# Patient Record
Sex: MALE | Race: WHITE | NOT HISPANIC OR LATINO | URBAN - METROPOLITAN AREA
[De-identification: names, ages, dates, MRNs, and addresses within clinical notes are randomized per-mention and may not be internally consistent; named-entity substitution may affect disease eponyms.]

---
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
## 2021-05-24 ENCOUNTER — RECORDS - HEALTHEAST (OUTPATIENT)
Dept: ADMINISTRATIVE | Facility: CLINIC | Age: 21
End: 2021-05-24

## 2021-05-29 NOTE — TELEPHONE ENCOUNTER
I spoke with pt's mom regarding what orders were needed for his nurse only appointment Thursday. She told me the [employer] is requiring him to have a dose of IPV within  6 months of entrance, Tdap/Td within 5yrs of entrance, he is due for 2nd dose of Hep A as well. Please place future orders.   Amelia Martinez, RMA

## 2021-05-31 VITALS — BODY MASS INDEX: 18.73 KG/M2 | HEIGHT: 71 IN | WEIGHT: 133.8 LBS

## 2021-06-01 VITALS — HEIGHT: 72 IN | WEIGHT: 143.7 LBS | BODY MASS INDEX: 19.46 KG/M2

## 2021-06-02 ENCOUNTER — RECORDS - HEALTHEAST (OUTPATIENT)
Dept: ADMINISTRATIVE | Facility: CLINIC | Age: 21
End: 2021-06-02

## 2021-06-02 VITALS — WEIGHT: 149.3 LBS | BODY MASS INDEX: 20.22 KG/M2 | HEIGHT: 72 IN

## 2021-06-11 NOTE — PROGRESS NOTES
[facility] Well Child Check    ASSESSMENT & PLAN  Curt Castañeda is a 16  y.o. 9  m.o. who has normal growth and normal development.  He needs a [employer] sports participation form filled out.  He also needs a [employer]  [employer] form filled out.      Diagnoses and all orders for this visit:    Well adolescent visit  -     Vision Screening  -     Hearing Screening  -     Meningococcal MCV4P        Return to clinic in 1 year for a Well Child Check or sooner as needed    IMMUNIZATIONS/LABS  Immunizations were reviewed and orders were placed as appropriate. and I have discussed the risks and benefits of all of the vaccine components with the patient/parents.  All questions have been answered.  He was noted to have an innocent, functional, murmur at his last checkup 2 years ago.  He has normal cardiac exam with no murmur heard today.    REFERRALS  Dental:  The patient has already established care with a dentist.  Other:  No additional referrals were made at this time.    ANTICIPATORY GUIDANCE  I have reviewed age appropriate anticipatory guidance.    HEALTH HISTORY  Do you have any concerns that you'd like to discuss today?: No concerns       Roomed by: Peggy    Accompanied by Mother    Refills needed? No    Do you have any forms that need to be filled out? Yes        Do you have any significant health concerns in your family history?: No  No family history on file.  Since your last visit, have there been any major changes in your family, such as a move, job change, separation, divorce, or death in the family?: No    Home  Who lives in your home?:    Social History     Social History Narrative    Mom- Eveline  Dad- Aaron    Sister- Jhoana     Do you have any trouble with sleep?:  No    Education  What school does your child attend?:  [employer]  What grade is your child in?:  11th  How does the patient perform in school (grades, behavior, attention, homework?: doing well     Eating  Does patient  "eat regular meals including fruits and vegetables?:  yes  What is the patient drinking (cow's milk, water, soda, juice, sports drinks, energy drinks, etc)?: cow's milk- whole and water  Does patient have concerns about body or appearance?:  No    Activities  Does the patient have friends?:  yes  Does the patient get at least one hour of physical activity per day?:  yes  Does the patient have less than 2 hours of screen time per day (aside from homework)?:  no, more than 2 hours  What does your child do for exercise?:  Cross country, lacrosse, nordic ski  Does the patient have interest/participate in community activities/volunteers/school sports?:  yes    MENTAL HEALTH SCREENING  PHQ-2 Total Score: 0 (6/26/2017  2:00 PM)  PHQ-2 Total Score: 0 (6/26/2017  2:00 PM)    VISION/HEARING  Vision: Completed. See Results  Hearing:  Completed. See Results     Hearing Screening    125Hz 250Hz 500Hz 1000Hz 2000Hz 3000Hz 4000Hz 6000Hz 8000Hz   Right ear:   25 20 20  20     Left ear:   25 20 20  20        Visual Acuity Screening    Right eye Left eye Both eyes   Without correction: 20/20 20/20 20/20   With correction:          TB Risk Assessment:  The patient and/or parent/guardian answer positive to:  patient and/or parent/guardian answer 'no' to all screening TB questions    Dental  Is your child being seen by a dentist?  Yes  Flouride Varnish Application Screening  Is child seen by dentist?     Yes    Patient Active Problem List   Diagnosis     Innocent heart murmur       Drugs  Does the patient use tobacco/alcohol/drugs?:  no    Safety  Does the patient have any safety concerns (peer or home)?:  no  Does the patient use safety belts, helmets and other safety equipment?:  yes    Sex  Is the patient sexually active?:  no    MEASUREMENTS  Height:  5' 10.75\" (1.797 m)  Weight: 133 lb 12.8 oz (60.7 kg)  BMI: Body mass index is 18.79 kg/(m^2).  Blood Pressure: 98/68  Blood pressure percentiles are 2 % systolic and 49 % diastolic " based on NHBPEP's 4th Report. Blood pressure percentile targets: 90: 134/83, 95: 138/87, 99 + 5 mmH/100.    PHYSICAL EXAM  Constitutional: He appears well-developed and well-nourished.   HEENT: Head: Normocephalic.    Right Ear: Tympanic membrane, external ear and canal normal.    Left Ear: Tympanic membrane, external ear and canal normal.    Nose: Nose normal.    Mouth/Throat: Mucous membranes are moist. Oropharynx is clear.    Eyes: Conjunctivae and lids are normal. Pupils are equal, round, and reactive to light. Optic disc is sharp.   Neck: Neck supple. No tenderness is present.   Cardiovascular: Normal rate and regular rhythm. No murmur heard today.  Pulses: Femoral pulses are 2+ bilaterally.   Pulmonary/Chest: Effort normal and breath sounds normal. There is normal air entry.   Abdominal: Soft. There is no hepatosplenomegaly. No inguinal hernia.   Genitourinary: Testes normal and penis normal. Jimy stage 4  Musculoskeletal: Normal range of motion. Normal strength and tone. No abnormalities. Spine is straight. Normal duck walk. Normal heel-to-toe walk.   Neurological: He is alert. He has normal reflexes. Gait normal.   Psychiatric: He has a normal mood and affect. His speech is normal and behavior is normal.  Skin: Clear. No rashes.

## 2021-06-18 NOTE — PROGRESS NOTES
[facility] Well Child Check    ASSESSMENT & PLAN  Curt Castañeda is a 17  y.o. 9  m.o. who has normal growth and normal development.   Mom declines HPV vaccine.  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    Diagnoses and all orders for this visit:    Well adolescent visit  -     Lipid Cascade RANDOM  -     Hearing Screening  -     Vision Screening  -     PHQ9 Depression Screen        -     Hepatitis A Vaccine      Return to clinic in 1 year for a Well Child Check or sooner as needed    IMMUNIZATIONS/LABS  Immunizations were reviewed and orders were placed as appropriate. and I have discussed the risks and benefits of all of the vaccine components with the patient/parents.  All questions have been answered.    REFERRALS  Dental:  The patient has already established care with a dentist.  Other:  No additional referrals were made at this time.    ANTICIPATORY GUIDANCE  I have reviewed age appropriate anticipatory guidance.    HEALTH HISTORY  Do you have any concerns that you'd like to discuss today?: No concerns       Roomed by: Amelia    Accompanied by Mother    Refills needed? No    Do you have any forms that need to be filled out? Yes        Do you have any significant health concerns in your family history?: No  No family history on file.  Since your last visit, have there been any major changes in your family, such as a move, job change, separation, divorce, or death in the family?: No  Has a lack of transportation kept you from medical appointments?: No    Home  Who lives in your home?:    Social History     Social History Narrative    Mom- Eveline  Dad- Aaron    Sister- Jhoana     Do you have any concerns about losing your housing?: No  Is your housing safe and comfortable?: Yes  Do you have any trouble with sleep?:  No    Education  What school do you child attend?:  [employer]  What grade are you in?:  12th  How do you perform in school (grades, behavior, attention, homework?: good      Eating  Do you eat regular meals including fruits and vegetables?:  yes, well balanced  What are you drinking (cow's milk, water, soda, juice, sports drinks, energy drinks, etc)?: cow's milk- whole and water  Have you been worried that you don't have enough food?: No  Do you have concerns about your body or appearance?:  No    Activities  Do you have friends?:  yes  Do you get at least one hour of physical activity per day?:  yes  How many hours a day are you in front of a screen other than for schoolwork (computer, TV, phone)?:  5  What do you do for exercise?:  Running. 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 See Results  Hearing:  Completed. See Results     Hearing Screening    125Hz 250Hz 500Hz 1000Hz 2000Hz 3000Hz 4000Hz 6000Hz 8000Hz   Right ear:   25 20 20  20 20    Left ear:   25 20 20  20 20       Visual Acuity Screening    Right eye Left eye Both eyes   Without correction: 20/20 20/20 20/20   With correction:      Comments: Plus Lens: Pass: blurring of vision with +2.50 lens glasses      TB Risk Assessment:  The patient and/or parent/guardian answer positive to:  patient and/or parent/guardian answer 'no' to all screening TB questions    Dyslipidemia Risk Screening  Have either of your parents or any of your grandparents had a stroke or heart attack before age 55?: No  Any parents with high cholesterol or currently taking medications to treat?: No     Dental  When was the last time you saw the dentist?: 3-6 months ago   Parent/Guardian declines the fluoride varnish application today.    Patient Active Problem List   Diagnosis     Innocent heart murmur       Drugs  Does the patient use tobacco/alcohol/drugs?:  no    Safety  Does the patient have any safety concerns (peer or home)?:   "no  Does the patient use safety belts, helmets and other safety equipment?:  yes    Sex  Have you ever had sex?:  No    MEASUREMENTS  Height:  5' 11.5\" (1.816 m)  Weight: 143 lb 11.2 oz (65.2 kg)  BMI: Body mass index is 19.76 kg/(m^2).  Blood Pressure: 102/60  Blood pressure percentiles are 3 % systolic and 16 % diastolic based on NHBPEP's 4th Report. Blood pressure percentile targets: 90: 136/86, 95: 140/90, 99 + 5 mmH/103.    PHYSICAL EXAM  Constitutional: He appears well-developed and well-nourished.   HEENT: Head: Normocephalic.    Right Ear: Tympanic membrane, external ear and canal normal.    Left Ear: Tympanic membrane, external ear and canal normal.    Nose: Nose normal.    Mouth/Throat: Mucous membranes are moist. Oropharynx is clear.    Eyes: Conjunctivae and lids are normal. Pupils are equal, round, and reactive to light. Optic disc is sharp.   Neck: Neck supple. No tenderness is present.   Cardiovascular: Normal rate and regular rhythm. No murmur heard.  Pulses: Femoral pulses are 2+ bilaterally.   Pulmonary/Chest: Effort normal and breath sounds normal. There is normal air entry.   Abdominal: Soft. There is no hepatosplenomegaly. No inguinal hernia.   Genitourinary: Testes normal and penis normal. Jimy stage 4  Musculoskeletal: Normal range of motion. Normal strength and tone. No abnormalities. Spine is straight. Normal duck walk. Normal heel-to-toe walk.   Neurological: He is alert. He has normal reflexes. Gait normal.   Psychiatric: He has a normal mood and affect. His speech is normal and behavior is normal.  Skin: Clear. No rashes.     "

## 2021-06-18 NOTE — LETTER
Letter by Tracy Arroyo CNP at      Author: Tracy Arroyo CNP Service: -- Author Type: --    Filed:  Encounter Date: 3/4/2019 Status: (Other)       March 4, 2019     Patient: Curt Castañeda   YOB: 2000   Date of Visit: 3/4/2019       To Whom it May Concern:    Curt Castañeda was seen in my clinic on 3/4/2019. Please excuse his absence.  He can return to school.    If you have any questions or concerns, please don't hesitate to call.    Sincerely,         Electronically signed by Tracy Arroyo CNP

## 2021-06-18 NOTE — LETTER
Letter by Tracy Arroyo CNP at      Author: Tracy Arroyo CNP Service: -- Author Type: --    Filed:  Encounter Date: 3/4/2019 Status: (Other)       March 4, 2019     Patient: Curt Castañeda   YOB: 2000   Date of Visit: 3/4/2019       To Whom it May Concern:    Curt Castañeda was seen in my clinic on 3/4/2019.   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  He had a opinion and  an EKG done through the  which was normal.  It was recommended that he have a visit for a third opinion here in his primary clinic.  He has been noted to have bradycardia in the past few years in our clinic also.  He is an athlete.  He has a normal exam here today with no murmurs noted.  Heart rate was 52.  We obtained an EKG which was read by myself and Dr. Nickie Powell as normal.  We sent it off for a second opinion with our cardiologist.  They also read the EKG as normal.  Enclosed is the copy of the original EKG.    JAMARI Carlos  If you have any questions or concerns, please don't hesitate to call.    Sincerely,         Electronically signed by Tracy Arroyo CNP

## 2021-06-24 NOTE — PROGRESS NOTES
[facility] Pediatric Acute Visit     HPI:  Curt Castañeda is a 18 y.o.  male who presents to the clinic with mom.  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  They completed paperwork and mom stated that when he was little he did have an innocent murmur but that that had resolved.  When he went in for his original physical they noted that he was bradycardic with a heart rate of 60.  Review of our chart shows that he is a very fit athlete and today's heart rate is even 52.  They obtained an EKG through the  clinic and it was normal.  They are here today because they have been told that the  would like a third opinion and wanted him to be seen by a physician and recommended another EKG be done.  We have gone ahead and done this today.         Past Med / Surg History:  Past Medical History:   Diagnosis Date     Innocent heart murmur      Pyloric stenosis, congenital      No past surgical history on file.    Fam / Soc History:  No family history on file.  Social History     Social History Narrative    Mom- Eveline Kaba- Aaron    Sister- Jhoana         ROS:  Gen: No fever or fatigue  Eyes: No eye discharge.   ENT: No nasal congestion or rhinorrhea. No pharyngitis. No otalgia.  Resp: No SOB, cough or wheezing.  GI:No diarrhea, nausea or vomiting  :No dysuria  MS: No joint/bone/muscle tenderness.  Skin: No rashes  Neuro: No headaches  Lymph/Hematologic: No gland swelling      Objective:  Vitals: /68   Pulse 52   Temp 98.4  F (36.9  C) (Oral)   Ht 6' (1.829 m)   Wt 149 lb 4.8 oz (67.7 kg)   BMI 20.25 kg/m      Gen: Alert, well appearing  Eyes: Conjunctivae clear bilaterally.   Heart: Regular rate and rhythm; normal S1 and S2; no murmurs, gallops, or rubs.  Lungs: Unlabored respirations; clear breath sounds.  Skin: Normal without lesions.  Neuro: Oriented. Normal reflexes; normal tone; no focal deficits appreciated. Appropriate for  age.  Hematologic/Lymph/Immune: No cervical lymphadenopathy  Psychiatric: Appropriate affect      Pertinent results / imaging:  Reviewed     Assessment and Plan:    Curt Castañeda is a 18 y.o. male with:    1. Bradycardia    - Electrocardiogram Perform and Read as normal by cardiology  I have given them a letter stating that he has a normal exam with no murmur.  His EKG was read by cardiology as normal.  The family will forward the attached EKG with the reading of being normal to the  clinic.        Tracy Arroyo CNP  3/5/2019